# Patient Record
Sex: MALE | Race: WHITE | URBAN - METROPOLITAN AREA
[De-identification: names, ages, dates, MRNs, and addresses within clinical notes are randomized per-mention and may not be internally consistent; named-entity substitution may affect disease eponyms.]

---

## 2017-05-10 ENCOUNTER — HISTORICAL (OUTPATIENT)
Dept: ADMINISTRATIVE | Facility: HOSPITAL | Age: 47
End: 2017-05-10

## 2017-06-22 ENCOUNTER — HISTORICAL (OUTPATIENT)
Dept: ADMINISTRATIVE | Facility: HOSPITAL | Age: 47
End: 2017-06-22

## 2017-08-27 ENCOUNTER — HOSPITAL ENCOUNTER (OUTPATIENT)
Dept: MEDSURG UNIT | Facility: HOSPITAL | Age: 47
End: 2017-08-28
Attending: NEUROLOGICAL SURGERY | Admitting: NEUROLOGICAL SURGERY

## 2022-04-30 NOTE — H&P
REASON FOR ADMISSION:  Increasing neck pain and weakness in left upper extremity.    HISTORY OF PRESENT ILLNESS:  The patient is a 46-year-old male, who underwent anterior cervical corpectomy at C5 on 7/31/17.  He did have a CSF leak lumbar drain after surgery.  This did improve and the patient progressed nicely.  He was discharged on 8/22/17.  At that time, the patient had some left upper extremity weakness and left shoulder pain that was controlled with oral medications.  He presented to the emergency room on 8/27/17 with increasing neck pain and left shoulder pain along with weakness and numbness in left upper extremity.  He was taking his pain medications, valium and Xanax regularly with no improvement of pain.  On questioning in the room, he states that he ran out of medications the day prior to presenting to the emergency room.  He complaints of diffuse posterior cervical pain and isolated left shoulder pain with numbness in the thumb and index finger of the left hand and upper extremity.  He states that he cannot move his left upper extremity hardly at all.  MRI of the neck was performed, which revealed bilateral mild foraminal stenosis at C4-5 and C5-6.  There is no cord compression.  Instrumentation is in place.     Currently, the patient is in bed sleeping with the left arm flexed against his chest.  Upon awakening, he said that he was in severe pain.  He does move the right upper extremity and lower extremities well.    PAST MEDICAL HISTORY:  Previous neck injury, arthritis and history of cataracts.    PAST SURGICAL HISTORY:  Inferior C5 corpectomy and plating from C4 to C6 on 7/31/17, bilateral cataract removal, splenectomy, elbow surgery and left shoulder surgery.    SOCIAL HISTORY:  The patient lives with his wife.  Denies any alcohol or illicit drug use.  He does chew tobacco.    FAMILY HISTORY:  Noncontributory.    REVIEW OF SYSTEMS:  The patient complains of posterior cervical pain, left  shoulder pain, left upper extremity weakness and numbness in thumb and index finger of the left hand, otherwise no other complaints.    PHYSICAL EXAMINATION:  GENERAL:   The patient was sleeping, but easily awakened.     HEENT:   Pupils are equal, round and reactive.  Incision in the anterior neck is clean, dry, intact and healing nicely.  No drainage or erythema noted.  Strength in the right upper extremity 5/5, lower extremity 5/5.  Strength in the left upper extremity is minimal.  He has minimal movement of the left upper extremity at this time.  He does have significant pain with movement of the left upper extremity or palpation to left shoulder.  Sensation is decreased in left upper extremity.    IMPRESSION AND PLAN:  The patient is a 46-year-old male, who underwent anterior C5 corpectomy on 7/31/17.  He had a long hospital stay due to cerebrospinal fluid leak, which did resolve and the patient was finally discharged from the hospital on 8/22/17.  At time of discharge, the patient had complaints of left shoulder pain, but was moving the left upper extremity somewhat.  He states that his left upper extremity and posterior cervical pain increased over the last five days, so he presented to the emergency room for further evaluation.  MRI of the cervical spine was performed, which revealed bilateral foraminal stenosis at C4-5 and C5-6.  However, this was found with no cord compression.  He is currently on oral medications for pain, along with muscle relaxants, which helped with pain somewhat.  We will consult physical therapy and occupational therapy.  Will also place consultation for rehab placement.  We will continue to follow the patient closely.     Dictated by ERIK De La Cruz        ______________________________  Isac Vasquez MD    IM/CD  DD:  08/28/2017  Time:  08:59AM  DT:  08/28/2017  Time:  09:59AM  Job #:  [id number]    The H&P was reviewed, the patient was examined, and the following changes to the patients  condition are noted:  ______________________________________________________________________________  ______________________________________________________________________________  ______________________________________________________________________________  [  ] No changes to the patient's condition:      ______________________________                                             ___________________  PHYSICIAN SIGNATURE                                                             DATE/TIME

## 2022-04-30 NOTE — DISCHARGE SUMMARY
* Final Report *    HP (Verified)  HISTORY OF PRESENT ILLNESS:  The patient was admitted last night for pain.  He ran out of medication on Friday and came to the emergency room for medication.  He was admitted after MRI of cervical spine done.  I saw this morning.     He was at his baseline.  He had difficulty using the left arm, deltoid, had some movement in biceps and triceps and .  Again, he seemed to be inconsistent.  He was moving his arm up above the head, seen by nurses and my assistant.  At this point, his common-law wife and I stepped out of the root and talked about him.  I confronted her about him saying he has multiple inconsistencies in terms of not able to move his arm, but he has moved his arm multiple times.  Multiple documentation has been put by various nurses, physical therapists, OT, doctors and by my assistant.  The wife wanted to see this. I told her he can be discharged this afternoon, get the records and come to my office in a few weeks.  He has been given adequate pain medication, muscle relaxants, and I discussed this with his common-law wife.        ______________________________  Isac Vasquez MD    IM/CD  DD:  08/28/2017  Time:  12:12PM  DT:  08/28/2017  Time:  12:45PM  Job #:  [id number]    The H&P was reviewed, the patient was examined, and the following changes to the patients condition are noted:  ______________________________________________________________________________  ______________________________________________________________________________  ______________________________________________________________________________  [  ] No changes to the patient's condition:      ______________________________                                             ___________________  PHYSICIAN SIGNATURE                                                             DATE/TIME       Result type: History and Physical  Result date: August 28, 2017 12:45 CDT  Result status: Auth  (Verified)  Result title: HP  Performed by: Isac Vasquez MD on August 28, 2017 12:12 CDT  Verified by: Isac Vasquez MD on August 29, 2017 13:37 CDT  Encounter info: [id number], [facility], Observation, 8/27/2017 - 8/28/2017  Contributor system: LEONELA    Moved by HIM

## 2022-04-30 NOTE — OP NOTE
Patient:   Warren Miller            MRN: [id number]            FIN: [id number]               Age:   46 years     Sex:  Male     :  1970   Associated Diagnoses:   None   Author:   Dinh Cobb MD      Preoperative Diagnosis: Cataract Right eye    Postoperative Diagnosis: Cataract Right eye    Procedure: Phacoemulsification with intaocular lens implantations Right eye    Surgeon: Dinh Cobb MD    Assistant: Марина Renteria [facility]    Anestheisa: Topical    Complications: None    The patient was brought into the operating suite, where the patient was correctly identified as was the operative eye via timeout.  The patient was prepped and draped in a sterile ophthalmic fashion.  A lid speculum was placed in the operative eye and the microscope was brought into place.  A 1.0mm paracentesis was then made at (12) o'clock.  The anterior chamber was filled with Endocoat.  A (temporal) clear corneal incision was made with a 2.4 mm keratome.  A 6 mm corneal marking ring was used to robb the cornea centered over the visual axis.  A 5.00 mm continuous curvilinear capsulorhexis was fashioned using a cystotome and microcapsular forceps.  Hydrodissection and hydrodelineation was performed with upreserved 1% Xylocaine.  The nucleus was then phacoemulsified with the Abbott phacoemulsification hand-piece with a total of (0) EFX.  The cortex was then removed with the Santino I/A hand-piece. An BESSY lens model (ZCB00) with a power of (17.0) was placed in the capsular bag.  The Helon was then removed from the eye with the Santino I/A hand piece. The anterior chamber was inflated and the wounds were hydrated with BSS.  The wounds were checked with Weck-Magaly sponges and found to be watertight.  The lid speculum was removed and topical antibiotics were placed on the operative eye.  The patient was brought to PACU in good condition.        Surgery Date 17 [facility]

## 2022-04-30 NOTE — ED PROVIDER NOTES
Patient:   Warren Miller            MRN: [id number]            FIN: [id number]               Age:   46 years     Sex:  Male     :  1970   Associated Diagnoses:   Uncontrolled pain; Cervical radiculopathy; Degenerative cervical disc; Status post cervical spinal fusion   Author:   Penny Child MD      Basic Information   Time seen: Date & time 2017 12:25:00.   History source: Patient.   Arrival mode: Private vehicle, wheelchair.   History limitation: None.      History of Present Illness   The patient presents with Patient reports neck surgery on 17 and dc from hospital on 17 after cervical fusion. Patient reports continued neck pain and progressive weakness of left upper extremity since. Pain uncontrolled on percocet. Denies reinjury. Dr. Vasquez is surgeon. ERIK Kohli in sort and     I,Dr. Child, assumed care of this patient at 1310.    46 year old male presents to the ED with complaints of moderate neck pain and left shoulder pain with associated weakness/numbness to the LUE onset 5 days. The patient states that he received a cervical spine surgery on 17 per Dr. Vasquez and was discharged on 17 stating that since this time he has had this pain that has gradually worsened prompting him to visit the ED today. The patient states that his Xanax, Valium and 20mg Percocet medications do not alleviate his pain. The patient denies urinary or bowel incontinence. The patient has a history of neck injury and arthritis.     .  The onset was 5  days ago.  The course/duration of symptoms is worsening.  Location: Neck cervical spine. Type of injury: surgery.  The location where the incident occurred was Surgery.  Radiating pain to the left upper extremity left shoulder.  The character of symptoms is pain and radiating pain: to the left and shoulder.  The degree at present is moderate.  The exacerbating factor is none.  The relieving factor is none.  Risk factors consist of  recent surgery.  Prior episodes: none.  Therapy today: none.  Associated symptoms: none.  Additional history: none.        Review of Systems   Constitutional symptoms:  No fever, no chills.    Skin symptoms:  No jaundice, no rash.    Eye symptoms:  Vision unchanged, No blurred vision,    Respiratory symptoms:  No shortness of breath, no orthopnea, no cough, no sputum production.    Cardiovascular symptoms:  No chest pain, no palpitations, no diaphoresis, no peripheral edema.    Gastrointestinal symptoms:  No abdominal pain, no nausea, no vomiting, no diarrhea, no constipation.    Genitourinary symptoms:  No dysuria, no hematuria.    Musculoskeletal symptoms:  neck pain and left shoulder pain.   Neurologic symptoms:  Numbness (LUE), weakness (LUE), no headache, no dizziness.    Hematologic/Lymphatic symptoms:  Bleeding tendency negative,    Allergy/immunologic symptoms:  No impaired immunity,       Health Status   Allergies:    Allergic Reactions (Selected)  Severity Not Documented  Penicillin allergy- Rash.  .   Medications:  (Selected)   Inpatient Medications  Ordered  morphine 10 mg/mL injectable solution: 10 mg, form: Injection, IV Push, Once, first dose 08/27/17 13:22:00 CDT, stop date 08/27/17 13:22:00 CDT, STAT, 24  Discontinued  Benadryl: 50 mg, form: Cap, Oral, q6hr PRN for itching, first dose 08/19/17 22:06:00 CDT, 30,022  Betadine 10% topical solution: 1 james, form: Soln-Top, TOP, As Directed, first dose 08/06/17 17:29:00 CDT  Chloraseptic mucous membrane lozenge: 1 lozenge(s), form: Lozenge, Oral, q1hr PRN for sore throat, first dose 07/31/17 11:26:00 CDT  Colace: 100 mg, form: Cap, Oral, BID, first dose 07/31/17 21:00:00 CDT, 24,034  Dilaudid: 0.5 mg, form: Injection, IV Push, q4hr PRN for pain, first dose 08/08/17 23:48:00 CDT, 26,051  Dulcolax Laxative 10 mg RECTAL suppository: 10 mg, form: Supp, GA, Daily PRN for constipation, first dose 07/31/17 11:26:00 CDT, 23  Percocet 10/325: 2 tab(s), form: Tab,  Oral, q4hr PRN for pain, first dose 08/05/17 10:06:00 CDT  Percocet 5/325: 1 tab(s), form: Tab, Oral, q4hr PRN for pain, moderate, first dose 07/31/17 11:26:00 CDT  Percocet 5/325: 2 tab(s), form: Tab, Oral, q4hr PRN for pain, severe, first dose 07/31/17 11:26:00 CDT  Senokot S: 2 tab(s), form: Tab, Oral, Once a day (at bedtime), first dose 07/31/17 21:00:00 CDT  Sodium Chloride 0.9% 1,000 mL: 1,000 mL, 1,000 mL, IV, 100 mL/hr, start date 07/31/17 11:26:00 CDT  Sodium Chloride 0.9% PF vial (For PICC Flush): 10 mL, form: Injection, IV Push, As Directed PRN for other (see comment), first dose 08/15/17 12:24:00 CDT, prior to administering IV meds and/or blood sampling  Sodium Chloride 0.9% PF vial (For PICC Flush): 10 mL, form: Injection, IV Push, q12hr, first dose 08/15/17 13:00:00 CDT, Flush each lumen following hospital policy for other flushing guidelines  Sodium Chloride 0.9% PF vial (For PICC Flush): 20 mL, form: Injection, IV Push, As Directed PRN for other (see comment), first dose 08/15/17 12:24:00 CDT, following administrations of IV medications and/or blood sampling  Tylenol: 650 mg, form: Tab, Oral, q4hr PRN for fever, first dose 07/31/17 11:26:00 CDT, Temperature > 38.1 degrees Celcius, 26,051  Ultram: 50 mg, form: Tab, Oral, q4hr PRN for pain, first dose 07/31/17 11:26:00 CDT, 26,051  Valium: 5 mg, form: Tab, Oral, q6hr PRN for spasm, first dose 07/31/17 11:26:00 CDT, muscle spasm, 30,022  Vasotec: 1.25 mg, form: Soln, IV Push, q6hr PRN for hypertension, first dose 07/31/17 11:26:00 CDT, systolic BP > 140 and/or diastolic > 90mmHG, 30,022  Xanax 0.5 mg oral tablet: 0.5 mg, form: Tab, Oral, TID PRN for anxiety, first dose 08/02/17 9:53:00 CDT, 26,022  Xylocaine HCl with Epinephrine 1:100,000-2% injectable solution: 50 mL, form: Injection, IV, Once-Unscheduled, first dose 08/08/17 4:38:00 CDT  Zanaflex: 4 mg, form: Tab, Oral, q4hr PRN for spasm, first dose 08/05/17 9:05:00 CDT, 26,051  Zofran ODT: 4 mg,  form: Tab-Dis, Oral, q4hr PRN for nausea/vomiting, first dose 08/08/17 10:59:00 CDT, 26,051  Zofran: 8 mg, IV Piggyback, q6hr PRN for nausea/vomiting, Infuse over: 20 minute(s), first dose 07/31/17 11:26:00 CDT, for severe N/V, 30,022  citalopram 20 mg oral tablet: 10 mg, form: Tab, Oral, Daily, first dose 08/01/17 9:00:00 CDT, 23  gabapentin 800 mg oral tablet: 400 mg, form: Tab, Oral, TID, first dose 08/16/17 14:00:00 CDT, 26,022  labetalol: 10 mg, form: Soln, IV Push, q2hr PRN for hypertension, first dose 07/31/17 11:26:00 CDT, systolic BP > 140 and/or diastolic > 90mmHG, 26,046  nitroglycerin 0.4 mg sublingual TAB: 0.4 mg, form: Tab-SL, SL, q5min PRN for chest pain, first dose 08/02/17 1:17:00 CDT, 30,025  phenol 1.4% topical spray: 1 spray(s), form: Spray, Oral, q1hr PRN for sore throat, first dose 07/31/17 11:26:00 CDT  Documented Medications  Documented  GABAPENTIN 400MG CAPSULE: 400 mg = 1 cap(s), Oral, TID  Percocet 10/325: 2 tab(s), Oral, q4hr, PRN PRN pain, 0 Refill(s)  Valium 5 mg oral tablet: 5 mg = 1 tab(s), Oral, q6hr, PRN PRN spasm, 0 Refill(s)  Xanax 0.5 mg oral tablet: 0.5 mg = 1 tab(s), Oral, TID, PRN PRN anxiety, 0 Refill(s)  Zanaflex 4 mg oral tablet: 4 mg = 1 tab(s), Oral, q4hr, PRN PRN spasm, 0 Refill(s)  citalopram 10 mg oral tablet: 10 mg = 1 tab(s), Oral, Daily, 0 Refill(s)  .   Immunizations: Per nurse's notes.      Past Medical/ Family/ Social History   Medical history:    Active  Arthritis ([id number])  Cataracts ([id number])  Wears glasses ([id number])  Neck injury ([id number])  Comments:  5/8/2017 CDT 12:46 CDT - Chivo GALLOWAY, Theresa WEBB  Injury from fall  Able to lie down ([id number])  Activity tolerance ([id number])  .   Surgical history:    Lumbar Drain Insertion (.) performed by Isma CASAS , [place] on 8/10/2017 at 46 Years.  Comments:  8/10/2017 20:46 - Huma Garcia RN  auto-populated from documented surgical case  Lumbar Drain  Insertion (., None) performed by Isac Vasquez MD on 8/1/2017 at 46 Years.  Comments:  8/1/2017 08:55 - Lloyd GALLOWAY , Delfin ANNE  auto-populated from documented surgical case  Cervical Fusion Anterior (.) performed by Isac Vasquez MD on 7/31/2017 at 46 Years.  Comments:  7/31/2017 12:02 - Saadia GALLOWAY, Lazaro WEBB  auto-populated from documented surgical case  42653 - RT CATARACT W/IOL 1 STA PHACO (Right) performed by Dinh Cobb MD on 6/22/2017 at 46 Years.  Comments:  6/22/2017 08:15 - Breanna Nolan  auto-populated from documented surgical case  [id number] - LT CATARACT W/IOL 1 STA PHACO (Left) performed by Dinh Cobb MD on 5/10/2017 at 46 Years.  Comments:  5/10/2017 09:46 - Lisa GALLOWAY, Brook RYAN  auto-populated from documented surgical case  Spleenectomy.  elbow surgery.  shoulder surgery.  .   Social history: Alcohol use: Denies, Tobacco use: Denies, Drug use: Denies.   Problem list: Per nurse's notes.      Physical Examination               Vital Signs   Vital Signs   8/27/2017 12:35 CDT      Peripheral Pulse Rate     93 bpm                             Heart Rate Monitored      92 bpm                             Respiratory Rate          26 br/min  HI                             SpO2                      92 %  LOW                             Oxygen Therapy            Room air                             Systolic Blood Pressure   133 mmHg                             Diastolic Blood Pressure  87 mmHg                             Mean Arterial Pressure, Cuff              102 mmHg    8/27/2017 12:24 CDT      Temperature Oral          36.5 DegC                             Peripheral Pulse Rate     103 bpm  HI                             Respiratory Rate          16 br/min                             SpO2                      96 %                             Oxygen Therapy            Room air                             Systolic Blood Pressure   121 mmHg                             Diastolic Blood Pressure  76 mmHg   .   Measurements   8/27/2017 12:24 CDT      Weight Dosing             82.5 kg                             Weight Measured and Calculated in Lbs     181.88 lb                             Weight Estimated          82.5 kg                             Height/Length Dosing      170 cm                             Height/Length Estimated   170 cm                             Body Mass Index Estimated 28.55 kg/m2  .   Basic Oxygen Information   8/27/2017 12:35 CDT      SpO2                      92 %  LOW                             Oxygen Therapy            Room air    8/27/2017 12:24 CDT      SpO2                      96 %                             Oxygen Therapy            Room air  .   General:  Alert, moderate distress (secondary to pain).    Skin:  Warm, dry.    Head:  Normocephalic, atraumatic.    Neck:  cervical spine incision is well healing without evidence of warmth, infection or induration, pain with any attempted ROM of the cervical spine, no stepoff deformity.    Eye:  Normal conjunctiva.   Ears, nose, mouth and throat:  Oral mucosa moist.   Cardiovascular:  Regular rate and rhythm, No murmur, Normal peripheral perfusion, No edema, Arterial pulses: Bilateral, radial, 2+.    Respiratory:  Lungs are clear to auscultation, respirations are non-labored.    Gastrointestinal:  Soft, Nontender.    Musculoskeletal:  holding LUE in position of comfort, will not allow ROM at shoulder due to pain hold LUE in a comforting position against the body .   Neurological:  Alert and oriented to person, place, time, and situation, LUE 4/5  strength compared to contralateral 5/5 fo RUE, will not participate in proximal muscle testing due to pain  Sensation subjectively diminished to light touch in all dermatomal distributions of LUE.    Psychiatric:  Cooperative.      Medical Decision Making   Rationale:  Patient status post ACDF on 7/31/17, prolonged postoperative course completed by CSF leak and concern for infection which  "was eventually ruled out, return with uncontrolled pain, vertigo symptoms and numbness, weakness of the left upper extremity.  No noted fever or chills.  Patient is in extreme distress due to pain.  Will attempt to evaluate with MRI with and without contrast to look for signs of infection or acute compressive pathology.   Documents reviewed:  Emergency department nurses' notes.   Orders  Launch Order Profile (Selected)   Inpatient Orders  Ordered  30 Day Readmission: 08/27/17 12:28:05 CDT, Stop date 08/27/17 12:28:05 CDT, "This patient has had an inpatient, observation, outpatient bedded or emergency visit within the last 30 days."  Admit to Outpatient Observation: 08/27/17 18:40:00 CDT, Pedro CASAS, [facility] Medical Unit, No  Capacity Management Bed Request: 08/27/17 18:40:40 CDT, Medical Unit  Discharge Planning Ongoing Assessment: 08/30/17 9:00:00 CDT, q3day  Ordered (Exam Completed)  MRI Cervical Spine W/O Contrast: Stat, 08/27/17 13:22:00 CDT, Pain, neck, s/p cervical spine fusion 1 month ago, worsening pain and increased LUE numbness, was admitted for concern possible CSF infection, eval for acute compressive lesion/evidence of CSF leak, None, Patient Bed, Maryjo...  Canceled (Exam Replaced)  MRI Cervical Spine W W/O Contrast: Stat, 08/27/17 13:22:00 CDT, Pain, neck, s/p cervical spine fusion 1 month ago, worsening pain and increased LUE numbness, was admitted for concern possible CSF infection, eval for acute compressive lesion/evidence of CSF leak, None, Patient Bed, Maryjo...  Completed  Dilaudid: 1 mg, form: Injection, IV Slow, Once, first dose 08/27/17 17:51:00 CDT, stop date 08/27/17 17:51:00 CDT, STAT, over at least 2 3 minutes, 24  POC ISTAT Chem8 Request:: Blood, Stat collect, Collected, 08/27/17 13:23:00 CDT by Penny Child MD, Stop date 08/27/17 13:23:00 CDT, Nurse collect, Print Label By Order Location  Point of Care iSTAT Chem8: Blood, Stat collect, Collected, 08/27/17 13:53:23 CDT  Valium: 5 mg, form: " Injection, IV, Now PRN for spasm, first dose 08/27/17 17:51:00 CDT, Waste Code STEVE, 992,051  Valium: 5 mg, form: Injection, IV, Once, first dose 08/27/17 16:16:00 CDT, stop date 08/27/17 16:16:00 CDT, STAT, Waste Code STEVE, 24  morphine 10 mg/mL injectable solution: 10 mg, form: Injection, IV Push, Once, first dose 08/27/17 13:22:00 CDT, stop date 08/27/17 13:22:00 CDT, STAT, 24  morphine 4 mg/mL preservative-free intravenous solution: 4 mg, form: Injection, IV Push, Once, first dose 08/27/17 15:43:00 CDT, stop date 08/27/17 15:43:00 CDT, STAT, ( > 7 on pain scale), 24  , Laboratory    POC ISTAT Chem8 Request:, Penny Child MD, 08/27/17, 13:23, Ordered  CT / MRI / Ultrasound    MRI Cervical Spine W W/O Contrast, Penny Child MD, 08/27/17, 13:22, Ordered.    Results review:  Lab results : Lab View   8/27/2017 13:53 CDT      POC Sodium                140 mmol/L                             POC Potassium             4.5 mmol/L                             POC Chloride              102 mmol/L                             POC Ion Calcium           1.18 mmol/L                             POC Glucose               84 mg/dL                             POC BUN                   13.0 mg/dL                             POC Creatinine            0.9 mg/dL                             POC AGAP                  17.0  NA                             POC Hb                    13.9 mg/dL                             POC Hct                   41.0 %                             POC TCO2                  27.0 mmol/L    .   Radiology results:  Reviewed radiologist's report, interpretation:  Impression next    Extensive multilevel degenerative changes, as detailed above, most pronounced at C4-5, C5-6, and C6-8.  Next    2.  The patient is status post anterior spinal fixation with metallic hardware and associated artifact at C4-6.    Prominent Schmorl's nodes at the superior endplate of C7 in both and platelet T 2 associated vertebral  deformities.       Reexamination/ Reevaluation   Notes: Patient has been awaiting MRI, has had multiple rounds of IV analgesics, patient over in MRI, unable to sit still due to refractory pain.  Nurse brought additional dose of medication the patient was noncompliant or brought back to the emergency department by MRI tech.  We'll attempt to re-control symptoms with valium and reattempt MRI. .   Notes: Patient's pain uncontrolled despite multiple rounds of IV pain medications and muscle relaxants.  MRI scan completed the suboptimal as all images requested not able to be obtained patient's intolerance of study.  Given uncontrolled pain, and recent surgical intervention, will admit for continued pain control, evaluation by his neurosurgeon in the morning..      Impression and Plan   Diagnosis   Uncontrolled pain (OAQ38-TF R52)   Cervical radiculopathy (JRR62-MV M54.12)   Degenerative cervical disc (YFL47-AX M50.30)   Status post cervical spinal fusion (EYI63-KI Z98.1)   Plan   Condition: Stable.    Disposition: Admit time  8/27/2017 18:40:00, Place in Observation Unit, Pedro CASAS, [facility], case discussed with Dr. Anatoliy Dai.    Notes: IIrma, acted solely as a scribe for and in the presence of Dr. Child who performed the service., Penny NORIEGA, have independently performed the history, physical, medical decision making and procedures as documented above and agree with the scribe's documentation. .

## 2022-04-30 NOTE — OP NOTE
Patient:   Warren Miller            MRN: [id number]            FIN: [id number]               Age:   46 years     Sex:  Male     :  1970   Associated Diagnoses:   None   Author:   Dinh Cobb MD      Preoperative Diagnosis: Cataract Left eye    Postoperative Diagnosis: Cataract Left eye    Procedure: Phacoemulsification with intaocular lens implantations Left eye    Surgeon: Dinh Cobb MD    Assistant: Марина Renteria [facility]    Anestheisa: Topical    Complications: None    The patient was brought into the operating suite, where the patient was correctly identified as was the operative eye via timeout.  The patient was prepped and draped in a sterile ophthalmic fashion.  A lid speculum was placed in the operative eye and the microscope was brought into place.  A 1.0mm paracentesis was then made at (6) o'clock.  The anterior chamber was filled with Endocoat.  A (temporal) clear corneal incision was made with a 2.4 mm keratome.  A 6 mm corneal marking ring was used to robb the cornea centered over the visual axis.  A 5.00 mm continuous curvilinear capsulorhexis was fashioned using a cystotome and microcapsular forceps.  Hydrodissection and hydrodelineation was performed with upreserved 1% Xylocaine.  The nucleus was then phacoemulsified with the Abbott phacoemulsification hand-piece with a total of (8) EFX.  The cortex was then removed with the Santino I/A hand-piece. An BESSY lens model (ZCB00) with a power of (17.0) was placed in the capsular bag.  The Helon was then removed from the eye with the Santino I/A hand piece.  The anterior chamber was inflated and the wounds were hydrated with BSS.  The wounds were checked with Weck-Magaly sponges and found to be watertight.  The lid speculum was removed and topical antibiotics were placed on the operative eye.  The patient was brought to PACU in good condition.      Surgery Date 05/10/17 [facility]